# Patient Record
Sex: FEMALE | Race: WHITE | NOT HISPANIC OR LATINO | Employment: OTHER | URBAN - METROPOLITAN AREA
[De-identification: names, ages, dates, MRNs, and addresses within clinical notes are randomized per-mention and may not be internally consistent; named-entity substitution may affect disease eponyms.]

---

## 2021-11-20 ENCOUNTER — APPOINTMENT (OUTPATIENT)
Dept: RADIOLOGY | Facility: CLINIC | Age: 31
DRG: 916 | End: 2021-11-20
Attending: STUDENT IN AN ORGANIZED HEALTH CARE EDUCATION/TRAINING PROGRAM

## 2021-11-20 ENCOUNTER — HOSPITAL ENCOUNTER (INPATIENT)
Facility: CLINIC | Age: 31
LOS: 1 days | Discharge: HOME | DRG: 916 | End: 2021-11-21
Attending: STUDENT IN AN ORGANIZED HEALTH CARE EDUCATION/TRAINING PROGRAM | Admitting: FAMILY MEDICINE

## 2021-11-20 DIAGNOSIS — F41.9 ANXIETY: Primary | ICD-10-CM

## 2021-11-20 DIAGNOSIS — T78.40XA ALLERGIC REACTION, INITIAL ENCOUNTER: ICD-10-CM

## 2021-11-20 LAB
AMPHETAMINES UR QL SCN: ABNORMAL
ANION GAP SERPL CALCULATED.3IONS-SCNC: 10 MMOL/L (ref 5–18)
ATRIAL RATE - MUSE: 87 BPM
BARBITURATES UR QL: ABNORMAL
BASOPHILS # BLD AUTO: 0 10E3/UL (ref 0–0.2)
BASOPHILS NFR BLD AUTO: 0 %
BENZODIAZ UR QL: ABNORMAL
BUN SERPL-MCNC: 11 MG/DL (ref 8–22)
CALCIUM SERPL-MCNC: 9.5 MG/DL (ref 8.5–10.5)
CANNABINOIDS UR QL SCN: ABNORMAL
CHLORIDE BLD-SCNC: 113 MMOL/L (ref 98–107)
CO2 SERPL-SCNC: 18 MMOL/L (ref 22–31)
COCAINE UR QL: ABNORMAL
CREAT SERPL-MCNC: 0.9 MG/DL (ref 0.6–1.1)
CREAT UR-MCNC: 32 MG/DL
DIASTOLIC BLOOD PRESSURE - MUSE: 78 MMHG
EOSINOPHIL # BLD AUTO: 0 10E3/UL (ref 0–0.7)
EOSINOPHIL NFR BLD AUTO: 0 %
ERYTHROCYTE [DISTWIDTH] IN BLOOD BY AUTOMATED COUNT: 12.1 % (ref 10–15)
GFR SERPL CREATININE-BSD FRML MDRD: 85 ML/MIN/1.73M2
GLUCOSE BLD-MCNC: 148 MG/DL (ref 70–125)
HCT VFR BLD AUTO: 37.9 % (ref 35–47)
HGB BLD-MCNC: 12.9 G/DL (ref 11.7–15.7)
IMM GRANULOCYTES # BLD: 0.1 10E3/UL
IMM GRANULOCYTES NFR BLD: 0 %
INTERPRETATION ECG - MUSE: NORMAL
LYMPHOCYTES # BLD AUTO: 3 10E3/UL (ref 0.8–5.3)
LYMPHOCYTES NFR BLD AUTO: 17 %
MAGNESIUM SERPL-MCNC: 2 MG/DL (ref 1.8–2.6)
MCH RBC QN AUTO: 29.9 PG (ref 26.5–33)
MCHC RBC AUTO-ENTMCNC: 34 G/DL (ref 31.5–36.5)
MCV RBC AUTO: 88 FL (ref 78–100)
MONOCYTES # BLD AUTO: 0.7 10E3/UL (ref 0–1.3)
MONOCYTES NFR BLD AUTO: 4 %
NEUTROPHILS # BLD AUTO: 14 10E3/UL (ref 1.6–8.3)
NEUTROPHILS NFR BLD AUTO: 79 %
NRBC # BLD AUTO: 0 10E3/UL
NRBC BLD AUTO-RTO: 0 /100
OPIATES UR QL SCN: ABNORMAL
OXYCODONE UR QL: ABNORMAL
P AXIS - MUSE: 61 DEGREES
PCP UR QL SCN: ABNORMAL
PLATELET # BLD AUTO: 350 10E3/UL (ref 150–450)
POTASSIUM BLD-SCNC: 3.3 MMOL/L (ref 3.5–5)
PR INTERVAL - MUSE: 172 MS
QRS DURATION - MUSE: 98 MS
QT - MUSE: 372 MS
QTC - MUSE: 447 MS
R AXIS - MUSE: 53 DEGREES
RBC # BLD AUTO: 4.31 10E6/UL (ref 3.8–5.2)
SARS-COV-2 RNA RESP QL NAA+PROBE: NEGATIVE
SODIUM SERPL-SCNC: 141 MMOL/L (ref 136–145)
SYSTOLIC BLOOD PRESSURE - MUSE: 126 MMHG
T AXIS - MUSE: 36 DEGREES
TSH SERPL DL<=0.005 MIU/L-ACNC: 1.74 UIU/ML (ref 0.3–5)
VENTRICULAR RATE- MUSE: 87 BPM
WBC # BLD AUTO: 17.8 10E3/UL (ref 4–11)

## 2021-11-20 PROCEDURE — 99223 1ST HOSP IP/OBS HIGH 75: CPT | Performed by: FAMILY MEDICINE

## 2021-11-20 PROCEDURE — 250N000013 HC RX MED GY IP 250 OP 250 PS 637: Performed by: INTERNAL MEDICINE

## 2021-11-20 PROCEDURE — 80307 DRUG TEST PRSMV CHEM ANLYZR: CPT | Performed by: FAMILY MEDICINE

## 2021-11-20 PROCEDURE — 272N000452 HC KIT SHRLOCK 5FR POWER PICC TRIPLE LUMEN

## 2021-11-20 PROCEDURE — 99285 EMERGENCY DEPT VISIT HI MDM: CPT | Mod: 25

## 2021-11-20 PROCEDURE — 250N000013 HC RX MED GY IP 250 OP 250 PS 637: Performed by: STUDENT IN AN ORGANIZED HEALTH CARE EDUCATION/TRAINING PROGRAM

## 2021-11-20 PROCEDURE — 250N000011 HC RX IP 250 OP 636: Performed by: INTERNAL MEDICINE

## 2021-11-20 PROCEDURE — 87635 SARS-COV-2 COVID-19 AMP PRB: CPT | Performed by: STUDENT IN AN ORGANIZED HEALTH CARE EDUCATION/TRAINING PROGRAM

## 2021-11-20 PROCEDURE — 258N000003 HC RX IP 258 OP 636: Performed by: STUDENT IN AN ORGANIZED HEALTH CARE EDUCATION/TRAINING PROGRAM

## 2021-11-20 PROCEDURE — 250N000009 HC RX 250: Performed by: STUDENT IN AN ORGANIZED HEALTH CARE EDUCATION/TRAINING PROGRAM

## 2021-11-20 PROCEDURE — C9803 HOPD COVID-19 SPEC COLLECT: HCPCS

## 2021-11-20 PROCEDURE — 96375 TX/PRO/DX INJ NEW DRUG ADDON: CPT

## 2021-11-20 PROCEDURE — 85025 COMPLETE CBC W/AUTO DIFF WBC: CPT | Performed by: STUDENT IN AN ORGANIZED HEALTH CARE EDUCATION/TRAINING PROGRAM

## 2021-11-20 PROCEDURE — 83735 ASSAY OF MAGNESIUM: CPT | Performed by: INTERNAL MEDICINE

## 2021-11-20 PROCEDURE — 200N000001 HC R&B ICU

## 2021-11-20 PROCEDURE — 80048 BASIC METABOLIC PNL TOTAL CA: CPT | Performed by: INTERNAL MEDICINE

## 2021-11-20 PROCEDURE — 96372 THER/PROPH/DIAG INJ SC/IM: CPT | Performed by: STUDENT IN AN ORGANIZED HEALTH CARE EDUCATION/TRAINING PROGRAM

## 2021-11-20 PROCEDURE — 93005 ELECTROCARDIOGRAM TRACING: CPT | Performed by: STUDENT IN AN ORGANIZED HEALTH CARE EDUCATION/TRAINING PROGRAM

## 2021-11-20 PROCEDURE — 36569 INSJ PICC 5 YR+ W/O IMAGING: CPT

## 2021-11-20 PROCEDURE — 36415 COLL VENOUS BLD VENIPUNCTURE: CPT | Performed by: STUDENT IN AN ORGANIZED HEALTH CARE EDUCATION/TRAINING PROGRAM

## 2021-11-20 PROCEDURE — 71045 X-RAY EXAM CHEST 1 VIEW: CPT

## 2021-11-20 PROCEDURE — 250N000012 HC RX MED GY IP 250 OP 636 PS 637: Performed by: STUDENT IN AN ORGANIZED HEALTH CARE EDUCATION/TRAINING PROGRAM

## 2021-11-20 PROCEDURE — 96365 THER/PROPH/DIAG IV INF INIT: CPT

## 2021-11-20 PROCEDURE — 96366 THER/PROPH/DIAG IV INF ADDON: CPT

## 2021-11-20 PROCEDURE — 36415 COLL VENOUS BLD VENIPUNCTURE: CPT | Performed by: INTERNAL MEDICINE

## 2021-11-20 PROCEDURE — 250N000011 HC RX IP 250 OP 636: Performed by: STUDENT IN AN ORGANIZED HEALTH CARE EDUCATION/TRAINING PROGRAM

## 2021-11-20 PROCEDURE — 96361 HYDRATE IV INFUSION ADD-ON: CPT

## 2021-11-20 PROCEDURE — 84443 ASSAY THYROID STIM HORMONE: CPT | Performed by: FAMILY MEDICINE

## 2021-11-20 PROCEDURE — 3E043XZ INTRODUCTION OF VASOPRESSOR INTO CENTRAL VEIN, PERCUTANEOUS APPROACH: ICD-10-PCS | Performed by: FAMILY MEDICINE

## 2021-11-20 RX ORDER — POTASSIUM CHLORIDE 7.45 MG/ML
10 INJECTION INTRAVENOUS
Status: DISCONTINUED | OUTPATIENT
Start: 2021-11-20 | End: 2021-11-20

## 2021-11-20 RX ORDER — PREDNISONE 20 MG/1
40 TABLET ORAL ONCE
Status: COMPLETED | OUTPATIENT
Start: 2021-11-20 | End: 2021-11-20

## 2021-11-20 RX ORDER — DIPHENHYDRAMINE HYDROCHLORIDE 50 MG/ML
25 INJECTION INTRAMUSCULAR; INTRAVENOUS ONCE
Status: COMPLETED | OUTPATIENT
Start: 2021-11-20 | End: 2021-11-20

## 2021-11-20 RX ORDER — PREDNISONE 20 MG/1
40 TABLET ORAL DAILY
Status: ON HOLD | COMMUNITY
Start: 2021-11-19 | End: 2021-11-21

## 2021-11-20 RX ORDER — ONDANSETRON 2 MG/ML
4 INJECTION INTRAMUSCULAR; INTRAVENOUS EVERY 6 HOURS PRN
Status: DISCONTINUED | OUTPATIENT
Start: 2021-11-20 | End: 2021-11-21 | Stop reason: HOSPADM

## 2021-11-20 RX ORDER — LIDOCAINE 40 MG/G
CREAM TOPICAL
Status: DISCONTINUED | OUTPATIENT
Start: 2021-11-20 | End: 2021-11-20 | Stop reason: CLARIF

## 2021-11-20 RX ORDER — EPINEPHRINE 1 MG/ML
0.3 INJECTION, SOLUTION INTRAMUSCULAR; SUBCUTANEOUS ONCE
Status: COMPLETED | OUTPATIENT
Start: 2021-11-20 | End: 2021-11-20

## 2021-11-20 RX ORDER — ONDANSETRON 4 MG/1
4 TABLET, ORALLY DISINTEGRATING ORAL EVERY 6 HOURS PRN
Status: DISCONTINUED | OUTPATIENT
Start: 2021-11-20 | End: 2021-11-21 | Stop reason: HOSPADM

## 2021-11-20 RX ORDER — DIPHENHYDRAMINE HYDROCHLORIDE 50 MG/ML
25 INJECTION INTRAMUSCULAR; INTRAVENOUS EVERY 12 HOURS
Status: DISCONTINUED | OUTPATIENT
Start: 2021-11-20 | End: 2021-11-21

## 2021-11-20 RX ORDER — DESVENLAFAXINE 100 MG/1
100 TABLET, EXTENDED RELEASE ORAL DAILY
COMMUNITY
Start: 2021-08-20

## 2021-11-20 RX ORDER — ACETAMINOPHEN 325 MG/1
650 TABLET ORAL EVERY 4 HOURS PRN
Status: DISCONTINUED | OUTPATIENT
Start: 2021-11-20 | End: 2021-11-21 | Stop reason: HOSPADM

## 2021-11-20 RX ORDER — BUSPIRONE HYDROCHLORIDE 10 MG/1
20 TABLET ORAL DAILY
COMMUNITY
Start: 2021-08-23 | End: 2022-08-23

## 2021-11-20 RX ORDER — EPINEPHRINE 0.3 MG/.3ML
0.3 INJECTION SUBCUTANEOUS PRN
Status: ON HOLD | COMMUNITY
Start: 2021-11-19 | End: 2021-11-21

## 2021-11-20 RX ORDER — CETIRIZINE HYDROCHLORIDE 10 MG/1
10 TABLET ORAL ONCE
Status: COMPLETED | OUTPATIENT
Start: 2021-11-20 | End: 2021-11-20

## 2021-11-20 RX ORDER — POTASSIUM CHLORIDE 29.8 MG/ML
20 INJECTION INTRAVENOUS ONCE
Status: COMPLETED | OUTPATIENT
Start: 2021-11-20 | End: 2021-11-20

## 2021-11-20 RX ORDER — LIDOCAINE 40 MG/G
CREAM TOPICAL
Status: DISCONTINUED | OUTPATIENT
Start: 2021-11-20 | End: 2021-11-21 | Stop reason: HOSPADM

## 2021-11-20 RX ORDER — METHYLPREDNISOLONE SODIUM SUCCINATE 40 MG/ML
40 INJECTION, POWDER, LYOPHILIZED, FOR SOLUTION INTRAMUSCULAR; INTRAVENOUS EVERY 12 HOURS
Status: DISCONTINUED | OUTPATIENT
Start: 2021-11-20 | End: 2021-11-21 | Stop reason: HOSPADM

## 2021-11-20 RX ORDER — METHYLPREDNISOLONE SODIUM SUCCINATE 125 MG/2ML
125 INJECTION, POWDER, LYOPHILIZED, FOR SOLUTION INTRAMUSCULAR; INTRAVENOUS ONCE
Status: COMPLETED | OUTPATIENT
Start: 2021-11-20 | End: 2021-11-20

## 2021-11-20 RX ORDER — SODIUM CHLORIDE 9 MG/ML
INJECTION, SOLUTION INTRAVENOUS CONTINUOUS
Status: DISCONTINUED | OUTPATIENT
Start: 2021-11-20 | End: 2021-11-21 | Stop reason: HOSPADM

## 2021-11-20 RX ORDER — DIPHENHYDRAMINE HCL 25 MG
50 CAPSULE ORAL EVERY 6 HOURS PRN
Status: ON HOLD | COMMUNITY
End: 2021-11-21

## 2021-11-20 RX ADMIN — POTASSIUM & SODIUM PHOSPHATES POWDER PACK 280-160-250 MG 1 PACKET: 280-160-250 PACK at 21:07

## 2021-11-20 RX ADMIN — DIPHENHYDRAMINE HYDROCHLORIDE 25 MG: 50 INJECTION INTRAMUSCULAR; INTRAVENOUS at 13:15

## 2021-11-20 RX ADMIN — POTASSIUM CHLORIDE 20 MEQ: 29.8 INJECTION, SOLUTION INTRAVENOUS at 16:47

## 2021-11-20 RX ADMIN — EPINEPHRINE 0.3 MG: 1 INJECTION INTRAMUSCULAR; INTRAVENOUS; SUBCUTANEOUS at 14:09

## 2021-11-20 RX ADMIN — CETIRIZINE HYDROCHLORIDE 10 MG: 10 TABLET, FILM COATED ORAL at 13:06

## 2021-11-20 RX ADMIN — LIDOCAINE HYDROCHLORIDE 2 ML: 10 INJECTION, SOLUTION EPIDURAL; INFILTRATION; INTRACAUDAL; PERINEURAL at 15:40

## 2021-11-20 RX ADMIN — METHYLPREDNISOLONE SODIUM SUCCINATE 125 MG: 125 INJECTION, POWDER, FOR SOLUTION INTRAMUSCULAR; INTRAVENOUS at 14:43

## 2021-11-20 RX ADMIN — SODIUM CHLORIDE 1000 ML: 9 INJECTION, SOLUTION INTRAVENOUS at 13:13

## 2021-11-20 RX ADMIN — POTASSIUM & SODIUM PHOSPHATES POWDER PACK 280-160-250 MG 1 PACKET: 280-160-250 PACK at 17:43

## 2021-11-20 RX ADMIN — FAMOTIDINE 20 MG: 10 INJECTION, SOLUTION INTRAVENOUS at 13:16

## 2021-11-20 RX ADMIN — EPINEPHRINE 0.03 MCG/KG/MIN: 1 INJECTION INTRAMUSCULAR; INTRAVENOUS; SUBCUTANEOUS at 14:29

## 2021-11-20 RX ADMIN — PREDNISONE 40 MG: 20 TABLET ORAL at 13:06

## 2021-11-20 ASSESSMENT — ACTIVITIES OF DAILY LIVING (ADL)
DOING_ERRANDS_INDEPENDENTLY_DIFFICULTY: NO
CONCENTRATING,_REMEMBERING_OR_MAKING_DECISIONS_DIFFICULTY: NO
DEPENDENT_IADLS:: INDEPENDENT
WALKING_OR_CLIMBING_STAIRS_DIFFICULTY: NO
ADLS_ACUITY_SCORE: 4
TOILETING_ISSUES: NO
VISION_MANAGEMENT: GLASSES
ADLS_ACUITY_SCORE: 8
DIFFICULTY_COMMUNICATING: NO
DIFFICULTY_EATING/SWALLOWING: NO
WEAR_GLASSES_OR_BLIND: YES
ADLS_ACUITY_SCORE: 4
HEARING_DIFFICULTY_OR_DEAF: NO
ADLS_ACUITY_SCORE: 4
ADLS_ACUITY_SCORE: 8
FALL_HISTORY_WITHIN_LAST_SIX_MONTHS: NO
DRESSING/BATHING_DIFFICULTY: NO

## 2021-11-20 ASSESSMENT — MIFFLIN-ST. JEOR: SCORE: 1615.36

## 2021-11-20 NOTE — CONSULTS
Care Management Initial Consult    General Information  Assessment completed with: Patient,    Type of CM/SW Visit: Initial Assessment    Primary Care Provider verified and updated as needed:     Readmission within the last 30 days:        Reason for Consult: discharge planning  Advance Care Planning:            Communication Assessment  Patient's communication style: spoken language (English or Bilingual)    Hearing Difficulty or Deaf: no   Wear Glasses or Blind: yes    Cognitive  Cognitive/Neuro/Behavioral: WDL                      Living Environment:   People in home: sibling(s)     Current living Arrangements: apartment      Able to return to prior arrangements: yes       Family/Social Support:  Care provided by: self  Provides care for: no one  Marital Status: Single  Sibling(s),Parent(s)          Description of Support System: Involved,Supportive    Support Assessment: Adequate social supports    Current Resources:   Patient receiving home care services: No     Community Resources: None  Equipment currently used at home: none  Supplies currently used at home: None    Employment/Financial:  Employment Status:          Financial Concerns: No concerns identified   Referral to Financial Counselor: No       Lifestyle & Psychosocial Needs:  Social Determinants of Health     Tobacco Use: Not on file   Alcohol Use: Not on file   Financial Resource Strain: Not on file   Food Insecurity: Not on file   Transportation Needs: Not on file   Physical Activity: Not on file   Stress: Not on file   Social Connections: Not on file   Intimate Partner Violence: Not on file   Depression: Not on file   Housing Stability: Not on file       Functional Status:  Prior to admission patient needed assistance:   Dependent ADLs:: Independent  Dependent IADLs:: Independent  Assesssment of Functional Status: At functional baseline    Mental Health Status:  Mental Health Status: No Current Concerns       Chemical Dependency Status:  Chemical  Dependency Status: No Current Concerns             Values/Beliefs:  Spiritual, Cultural Beliefs, Hoahaoism Practices, Values that affect care: no               Additional Information:  JUVENAL assessed. Pt resides at home with her sister, she is independent with ADLs/IADLs at baseline. Discharge plan to return home, no anticipated CM needs.    Teena Campuzano LGSW

## 2021-11-20 NOTE — ED TRIAGE NOTES
Pt presents to triage with allergic reaction to unknown allergen. No previous known allergies or hx of anaphylaxis. Pt began to have hives for about 2 weeks and yesterday oral pharengeal swelling began. Patient was seen in urgen care and given prednison, benadryl and epi. Symptoms improved and patient was discharge. This morning symptoms have returned. Pt stated it is difficult to breath d/t throat tightness  Madhavi Ng RN on 11/20/2021 at 12:25 PM     You were seen by  Dr Gramajo     1. Stop metoprolol.     2. Increase fluids (water)    3. Lexiscan Stress test and MRI of the brain  has been ordered. You will be called to schedule this.  You will receive instructions for testing at that time.  You will be contacted with results.        You will follow up with [facility] in 3-6, sooner if needed.       Please call the cardiology office with problems, questions, or concerns at [phone].    If you experience chest pain, chest pressure, chest tightness, shortness of breath, fainting, lightheadedness, nausea, vomiting, or other concerning symptoms, please report to the Emergency Department or call 911. These symptoms may be emergent, and best treated in the Emergency Department.     Cardiology Nurses  VIOLET Askew RN Corrine, MAULIK Vazquez LPN  [facility] ([facility])  [phone]

## 2021-11-20 NOTE — PHARMACY-ADMISSION MEDICATION HISTORY
Pharmacy Note - Admission Medication History    Pertinent Provider Information: I was not able to access the pt's fill history at this time, so I worked with care everywhere and the med reconciliation list as well as the patient's report to gather list    Pt stated she started taking the prednisone prescription today, there are 4 doses left of the prescription listed below    Pt stated the doctor at urgent care yesterday told her to stop taking her desvenlafaxine, so she didn't take it today    Pt claims to have taken benadryl 25mg - 2 capsules at a time 4 times today, with the last dose being at 1300     ______________________________________________________________________    Prior To Admission (PTA) med list completed and updated in EMR.       PTA Med List   Medication Sig Last Dose     busPIRone (BUSPAR) 10 MG tablet Take 20 mg by mouth daily 11/20/2021     desvenlafaxine (PRISTIQ) 100 MG 24 hr tablet Take 100 mg by mouth daily 11/19/2021     diphenhydrAMINE (BENADRYL) 25 MG capsule Take 50 mg by mouth every 6 hours as needed for itching or allergies 11/20/2021 at at 1300     EPINEPHrine (ANY BX GENERIC EQUIV) 0.3 MG/0.3ML injection 2-pack Inject 0.3 mg into the muscle as needed 11/20/2021     levonorgestrel (MIRENA) 20 MCG/24HR IUD 1 each by Intrauterine route continuous      predniSONE (DELTASONE) 20 MG tablet Take 40 mg by mouth daily For 5 days 11/20/2021       [facility]  Method of interview communication: in-person    Summary of Changes to PTA Med List  New: all meds    Patient was asked about OTC/herbal products specifically.  PTA med list reflects this.    In the past week, patient estimated taking medication this percent of the time:  greater than 90%.    Allergies were reviewed, assessed, and updated with the patient.      Patient does not use any multi-dose medications prior to admission.    The information provided in this note is only as accurate as  the sources available at the time of the update(s).    Thank you for the opportunity to participate in the care of this patient.    Moira Smith  11/20/2021 2:24 PM

## 2021-11-20 NOTE — ED NOTES
Spoke with intensivist. States ok to restart epi drip.  Lips continue to be swollen but states tongue does not feel as much as previously

## 2021-11-20 NOTE — ED PROVIDER NOTES
EMERGENCY DEPARTMENT ENCOUNTER       ED Course & Medical Decision Making   12:29 PM I met with the patient, obtained history, performed an initial exam, and discussed options and plan for diagnostics and treatment here in the ED. PPE worn: N95, eye protection, gloves.    Final Impression  31 year old female presents for evaluation of refractory anaphylaxis.  Patient reports that she began having significant urticaria for the past 2 weeks, unknown precipitating agent.  Denies history of severe allergies or allergic reactions like anaphylaxis.  In the last few days it progressed to lip and face swelling including the puffiness under her eyes which prompted her to go to the urgent care last night, was given EpiPen and H1/H2 blockers and steroids and symptoms improved.  Was discharged home on steroids, though was on the lower end of the dosing spectrum at 20 mg/day.  States that her symptoms of lip and face swelling did rebound late last night and continued to worsen throughout the day today despite the 20 mg of steroids she took this morning and the H1/H2 blockers.  Patient states that she is uninsured, is concerned about cost.  No signs of imminent airway distress at time of initial evaluation, lungs clear, no wheezing, managing secretions without difficulty.  Does have some lip swelling and some swelling under her eyes, though is otherwise protecting her airway.  Initially trialed with additional H1, H2 blockers and an additional 40 mg of prednisone (20 mg of prednisone earlier this morning) and observed for a while in the ED.  Symptoms not at all improved, if anything there may be slight worsening she has a audible lisp now, though the lip swelling appears unchanged, may now have a tiny bit of tongue swelling as well.  At this juncture, we unfortunately do need to readminister epinephrine and start patient on an epi drip and admit for refractory anaphylaxis.  PICC line ordered, EpiPen and epi drip ordered.   Discussed with intensivist and hospitalist.    Prior to making a final disposition on this patient the results of patient's tests and other diagnostic studies were discussed with the patient. All questions were answered. Patient expressed understanding of the plan and was amenable to it.    Medications   predniSONE (DELTASONE) tablet 40 mg (40 mg Oral Given 11/20/21 1306)   diphenhydrAMINE (BENADRYL) injection 25 mg (25 mg Intravenous Given 11/20/21 1315)   famotidine (PEPCID) injection 20 mg (20 mg Intravenous Given 11/20/21 1316)   cetirizine (zyrTEC) tablet 10 mg (10 mg Oral Given 11/20/21 1306)   0.9% sodium chloride BOLUS (1,000 mLs Intravenous New Bag 11/20/21 1313)     Final Impression     1. Allergic reaction, initial encounter        Critical Care     Performed by: Isrrael Ruiz MD  Authorized by: Isrrael Ruiz MD                   Total critical care time: 30 minutes  Critical care was necessary to treat or prevent imminent or life-threatening deterioration of the following conditions:  Recurrent anaphylaxis requiring epi infusion  Critical care was time spent personally by me on the following activities: development of treatment plan with patient or surrogate, discussions with consultants, examination of patient, evaluation of patient's response to treatment, obtaining history from patient or surrogate, ordering and performing treatments and interventions, ordering and review of laboratory studies, ordering and review of radiographic studies, re-evaluation of patient's condition and monitoring for potential decompensation.  Critical care time was exclusive of separately billable procedures and treating other patients.    Chief Complaint     Chief Complaint   Patient presents with     Allergic Reaction       Pt presents to triage with allergic reaction to unknown allergen. Pt began to have hives and oral pharengeal swelling yesterday and was [facility] and given prednison, benadryl and  epi. Symptoms improved and patient was discharge. This morning symptoms have returned. Pt stated it is difficult to breath d/t throat tightness  Madhavi Ng RN on 11/20/2021 at 12:25 PM    LORI Ayala is a 31 year old female who presents for evaluation of an allergic reaction.    Per chart review, patient was seen yesterday, 11/19/21, at [facility] for evaluation of an allergic reaction. She had sudden onset of diffuse hives two weeks prior. She was given 50 mg Benadryl, Epi Pen injection, Pepcid, and Solu-Medrol. Signs and symptoms of anaphylaxis stabilized and improved with noted interventions. She was observed for a period of time. Given rapid resolution and lack of serious systemic symptoms, and lack or respiratory difficulty, no admission for anaphylaxis at the time. No signs of anaphylactic shock. She was discharged home in stable condition with Epi Pen, steroids, and Pepcid.     Patient reports two weeks of hives for which she has been taking Benadryl at home. She notes significant itching over most of her body. Yesterday, patient states her tongue began to swell causing some throat tightness. She also notes significant lip swelling. She was seen in urgent care and given an Epi Pen and Benadryl with relief. States her symptoms have returned and worsened today, except that he tongue does not feel as swollen yet. She has taken 20 mg Prednisone this morning. She denies any history of allergic reactions or any known allergies. Denies any recent new foods, detergents, soaps, pet exposures, or any other known triggers.    Patient denies any associated wheezing or shortness of breath. Denies any history of medical problems.      I, Dontrell Eubanks, am serving as a scribe to document services personally performed by Dr. Isrrael Ruiz MD, based on my observation and the provider's statements to me. I, Dr. Isrrael Ruiz MD attest that Dontrell Eubanks is acting in a scribe capacity, has  observed my performance of the services and has documented them in accordance with my direction.    Past Medical History     History reviewed. No pertinent past medical history.  Past Surgical History:   Procedure Laterality Date     HC REDUCTION OF LARGE BREAST      Description: Breast Surgery Reduction Procedure;  Recorded: 10/08/2012;     HC REMOVAL OF TONSILS,<13 Y/O      Description: Tonsillectomy;  Recorded: 10/08/2012;     History reviewed. No pertinent family history.   Social History     Tobacco Use     Smoking status: None     Smokeless tobacco: None   Substance Use Topics     Alcohol use: None     Drug use: None       Relevant past medical, surgical, family and social history as documented above, has been reviewed and discussed with patient. No changes or additions, unless otherwise noted in the HPI.    Current Medications     No current outpatient medications on file.    Allergies     No Known Allergies    Review of Systems     Constitutional: Denies fever, chills, unintentional weight loss  Eyes: Denies visual changes or discharge  HENT: Denies sore throat, ear pain or neck pain. Positive for tongue and lip swelling, throat tightness.  Respiratory: Denies cough, wheezing  Cardiovascular: Denies chest pain, palpitations  GI: Denies abdominal pain, nausea, vomiting  : Denies hematuria, dysuria, or flank pain  Musculoskeletal: Denies any new back pain or new muscle/joint pains  Skin: Denies wound. Positive for diffuse body rash with associated itching.  Neurologic: Denies current headache, new weakness, focal weakness    Remainder of systems reviewed, unless noted in HPI all others negative.    Physical Exam     BP (!) 143/97   Pulse 95   Temp 97.1  F (36.2  C) (Temporal)   Resp 20   Wt 79.4 kg (175 lb)   SpO2 98%   Constitutional: Awake, alert, in no acute distress  Head: Normocephalic, atraumatic.  ENMT: Mucous membranes moist.  Upper and lower lips both fairly swollen, tongue appears normal.   Uvula midline.  Managing secretions without difficulty.  Eyes: Slight puffiness underneath eyes bilaterally  Respiratory: Respirations even, unlabored. Lungs clear to ascultation bilaterally, no wheezing, no respiratory distress, saturations 98% on room air.   Cardiovascular: Regular rate and rhythm. +2 radial pulses, equal bilaterally.  GI: Abdomen soft, non-tender. No guarding or rebound.   Musculoskeletal: Moves all 4 extremities equally, strength symmetrical on bilateral uppers and lowers.  Integument: Extensive urticaria throughout trunk and upper extremities, patient itching uncontrollably  Neurologic: Alert & oriented x 3. Normal speech. Grossly normal motor and sensory function.   Psychiatric: Normal mood and affect.    Labs & Imaging     Results for orders placed or performed during the hospital encounter of 11/20/21   CBC with platelets and differential   Result Value Ref Range    WBC Count 17.8 (H) 4.0 - 11.0 10e3/uL    RBC Count 4.31 3.80 - 5.20 10e6/uL    Hemoglobin 12.9 11.7 - 15.7 g/dL    Hematocrit 37.9 35.0 - 47.0 %    MCV 88 78 - 100 fL    MCH 29.9 26.5 - 33.0 pg    MCHC 34.0 31.5 - 36.5 g/dL    RDW 12.1 10.0 - 15.0 %    Platelet Count 350 150 - 450 10e3/uL    % Neutrophils 79 %    % Lymphocytes 17 %    % Monocytes 4 %    % Eosinophils 0 %    % Basophils 0 %    % Immature Granulocytes 0 %    NRBCs per 100 WBC 0 <1 /100    Absolute Neutrophils 14.0 (H) 1.6 - 8.3 10e3/uL    Absolute Lymphocytes 3.0 0.8 - 5.3 10e3/uL    Absolute Monocytes 0.7 0.0 - 1.3 10e3/uL    Absolute Eosinophils 0.0 0.0 - 0.7 10e3/uL    Absolute Basophils 0.0 0.0 - 0.2 10e3/uL    Absolute Immature Granulocytes 0.1 (H) <=0.0 10e3/uL    Absolute NRBCs 0.0 10e3/uL       EKG: Sinus rhythm, rate 87.  .  QRS 98.  QTc 447.  No STEMI.     Isrrael Ruiz MD  11/20/21 4503

## 2021-11-20 NOTE — PROCEDURES
"PICC Line Insertion Procedure Note  Pt. Name: Lo Ayala  MRN:        [id number]  Procedure: Insertion of a  triple Lumen  5 fr  Bard SOLO (valved) Power PICC, Lot number [id number]    Indications: allergic reaction/epi drip    Contraindications : none    Procedure Details   Patient identified with 2 identifiers and \"Time Out\" conducted.  .     Central line insertion bundle followed: hand hygeine performed prior to procedure, site cleansed with cholraprep, hat, mask, sterile gloves,sterile gown worn, patient draped with maximum barrier head to toe drape, sterile field maintained.    The vein was assessed and found to be compressible and of adequate size. 2 ml 1% Lidocaine administered sq to the insertion site. A 5 Fr PICC was inserted into the basilic vein of the right arm with ultrasound guidance. 1 attempt(s) required to access vein.   Catheter threaded without difficulty. Good blood return noted.    Modified Seldinger Technique used for insertion.    The 8 sharps that are included in the PICC insertion kit were accounted for and disposed of in the sharps container prior to breakdown of the sterile field.    Catheter secured with Statlock, biopatch and Tegaderm dressing applied.    Findings:  Total catheter length  46 cm, with 2 cm exposed. Mid upper arm circumference is 29 cm. Catheter was flushed with 30 cc NS. Patient  tolerated procedure well.    Tip placement verified by 3CG technology . Tip placement in the SVC.    CLABSI prevention brochure left at bedside.    Patient's primary RN notified PICC is ready for use.    Comments:            Nahomi Cody, RN [facility]      "

## 2021-11-20 NOTE — ED NOTES
"Runs of bigeminy noted on monitor. Hospitalist paged and epi drip temporarily held.  Patient states ok but she can feel \"palpitations\"  "

## 2021-11-20 NOTE — ED NOTES
Continues to have runs of bigeminy. Continues to complain of palpations and slight chest pain when having frequent PVC's but chest pain not present ST on monitor

## 2021-11-20 NOTE — H&P
[facility] MEDICINE ADMISSION HISTORY AND PHYSICAL     Assessment & Plan       Lo Ayala is a 31 year old old female with history of longstanding depression and anxiety presented with 2-week history of intermittent urticaria which seem to respond to diphenhydramine.  Yesterday started having swelling of her tongue and lips and went to the Urgency Room.  She was given IV Solu-Medrol, diphenhydramine, famotidine and IM epinephrine with significant improvement.  Was discharged on prednisone 20 mg a day x5 days along with continuing her as needed diphenhydramine.  She had recurrence of rash with pruritus earlier today along with tongue swelling and lip swelling and presented to our emergency room.  Here she has been treated with oral prednisone 40 mg, IV Solu-Medrol 125 mg, IV epinephrine, IV famotidine and diphenhydramine.  After approximately 1 hour there was no improvement in symptoms and emergency room physician elected to start her on IV epinephrine drip and PICC line has been ordered and placed.  Patient did receive IV normal saline bolus of 1000 mL.  With bigeminy the epinephrine drip was temporarily held and magnesium was normal potassium 3.3.  This is in the process of being replaced.  Intensivist has been consulted and patient will be in the ICU.  Intensivist has okayed restarting the epinephrine after IV potassium.  Patient reports that her tongue swelling seems to have resolved and her lip swelling is moderately improved and the pruritus she had earlier today has also resolved.  She denies any respiratory symptoms.    Refractory anaphylaxis.  Etiology unclear.  No new medications.  Denies any supplements or over-the-counter medications other than occasionally some acetaminophen.  She did take 1 dose of Excedrin yesterday at onset of worsening symptoms but had not taken that prior.  Reports symptoms have been worse at night.  She switch to Free and clear 1 week ago from [employer].  Uses [employer] fabric softener sheets.  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  Uses lots of products but nothing new.  She was prescribed topical diclofenac about 4 months ago but reports she never took it.  Denies any NSAID use.  Since she now seems moderately improved we will let her eat.    Bigeminy which seems to be coincidental and started about 12 minutes after epinephrine drip.  Plan: Replace potassium and follow on telemetry.  Intensivist is aware.  I discussed with pharmacist and no current medications including acute medicines should be related to this.    Hypokalemia with potassium 3.3.  Suspect in part due to 1 L of normal saline.  Plan: Potassium replacement per intensivist    Chronic depression and anxiety.  Has been on buspirone since July 2021 and desvenlafaxine since February 2021.  She did not take either of these medications today.  Plan: Put on hold both the buspirone and the desvenlafaxine for now.  She is fine with this.    Past history of hypothyroidism on old problem list.  Not on any current medications.  Plan: Check TSH    Past history of tobacco use disorder.  Quit in 2019.  Had been smoking 1/4 pack/day.    Marijuana use.  Last used 2 days ago and only occasionally uses not even once a week.    Alcohol use is rare and none x2 weeks.    Covid status: Covid infection in June 2021.  Fully immunized now with more during vaccine second shot completed in July 2021.    Trip to [place] in July 2021.      CODE STATUS: Full code    DVT prophylaxis: Ambulation as she is at low risk    Disposition: Inpatient and in the ICU.    Goals for the hospitalization: Try to play  and determine the etiology of her recurrent anaphylaxis and urticaria.  Resolution of anaphylaxis symptoms.    Chief Complaint  recurrent anaphylaxis     HISTORY     Lo Ayala is a 31 year old old female with past history of persistent anxiety and depression.  Overall has been doing well until 2 weeks prior to  this time and she developed recurrent urticaria that seem to improve with diphenhydramine.  No recent change in any medications or topical products.  Yesterday had new symptom of swelling of her tongue and lips.  Was seen in the Urgency Room and responded to appropriate medicines but had recurrent symptoms today and presented to our emergency room.    Past Medical History     Anxiety  Depression      Surgical History     Past Surgical History:   Procedure Laterality Date     HC REDUCTION OF LARGE BREAST      Description: Breast Surgery Reduction Procedure;  Recorded: 10/08/2012;     HC REMOVAL OF TONSILS,<13 Y/O      Description: Tonsillectomy;  Recorded: 10/08/2012;   Elbow surgery in 2021  Family History    Reviewed, and History reviewed. No pertinent family history.  Denies any family history of anaphylaxis or allergic reactions  Social History      She lives in an apartment with her sister  Runs her own dog [employer] business  Quit smoking in 2019 after 1/4 pack/day  Alcohol use is rare and none x2 weeks  Occasional marijuana use but less than weekly    Allergies   No Known Allergies  Prior to Admission Medications      Prior to Admission Medications   Prescriptions Last Dose Informant Patient Reported? Taking?   EPINEPHrine (ANY BX GENERIC EQUIV) 0.3 MG/0.3ML injection 2-pack 2021  Yes Yes   Sig: Inject 0.3 mg into the muscle as needed   busPIRone (BUSPAR) 10 MG tablet 2021  Yes Yes   Sig: Take 20 mg by mouth daily   desvenlafaxine (PRISTIQ) 100 MG 24 hr tablet 2021  Yes Yes   Sig: Take 100 mg by mouth daily   diphenhydrAMINE (BENADRYL) 25 MG capsule 2021 at at 1300  Yes Yes   Sig: Take 50 mg by mouth every 6 hours as needed for itching or allergies   levonorgestrel (MIRENA) 20 MCG/24HR IUD   Yes Yes   Si each by Intrauterine route continuous   predniSONE (DELTASONE) 20 MG tablet 2021  Yes Yes   Sig: Take 40 mg by mouth daily For 5 days      Facility-Administered  Medications: None      Review of Systems     A 12 point comprehensive review of systems was negative except as noted above in HPI.    PHYSICAL EXAMINATION       Vitals      Temp:  [97.1  F (36.2  C)] 97.1  F (36.2  C)  Pulse:  [84-99] 88  Resp:  [20-26] 26  BP: (114-143)/(64-97) 120/72  SpO2:  [94 %-99 %] 97 %    Examination     GENERAL:  Alert, appears comfortable, in no acute distress, appears stated age   HEAD:  Normocephalic, without obvious abnormality, atraumatic   EYES:  PERRL, EOM's intact   NOSE: No drainage   THROAT: Lips, mucosa, and tongue normal; teeth and gums normal, mouth moist.  She states her tongue was definitely swollen earlier but now that has resolved.  Lips appear to be mildly swollen and she states that is definitely improved from when she presented to the emergency room   NECK: Supple, symmetrical, trachea midline   BACK:   Symmetric, no curvature, ROM normal   LUNGS:   Clear to auscultation bilaterally, no rales, rhonchi, or wheezing, symmetric chest rise on inhalation, respirations unlabored   HEART:  Regular rate and rhythm, S1 and S2 normal, no murmur, rub, or gallop    ABDOMEN:   Soft, non-tender, bowel sounds active all four quadrants, no masses, no organomegaly, no rebound or guarding   EXTREMITIES: Extremities normal, atraumatic, no cyanosis or edema    SKIN: Dry to touch, no exanthems in the visualized areas   NEURO: Alert, appears cognitively intact, moves all four extremities freely, non-focal   PSYCH: Cooperative, behavior is appropriate      Pertinent Lab     Most Recent 3 CBC's:Recent Labs   Lab Test 11/20/21  1312   WBC 17.8*   HGB 12.9   MCV 88        Most Recent 3 BMP's:Recent Labs   Lab Test 11/20/21  1505      POTASSIUM 3.3*   CHLORIDE 113*   CO2 18*   BUN 11   CR 0.90   ANIONGAP 10   MAVERICK 9.5   *     Most Recent 2 LFT's:No lab results found.      Pertinent Radiology     EKG Results: Sinus rhythm with some nonspecific ST changes.  Otherwise  unremarkable    Advance Care Planning        Bill Zuñiga MD  [facility]   Phone: #[phone]

## 2021-11-21 VITALS
RESPIRATION RATE: 20 BRPM | OXYGEN SATURATION: 96 % | TEMPERATURE: 96.2 F | BODY MASS INDEX: 28.46 KG/M2 | WEIGHT: 187.8 LBS | DIASTOLIC BLOOD PRESSURE: 85 MMHG | HEART RATE: 62 BPM | HEIGHT: 68 IN | SYSTOLIC BLOOD PRESSURE: 140 MMHG

## 2021-11-21 LAB
ANION GAP SERPL CALCULATED.3IONS-SCNC: 10 MMOL/L (ref 5–18)
BASOPHILS # BLD AUTO: 0 10E3/UL (ref 0–0.2)
BASOPHILS NFR BLD AUTO: 0 %
BUN SERPL-MCNC: 11 MG/DL (ref 8–22)
C4 SERPL-MCNC: 26 MG/DL (ref 19–59)
CALCIUM SERPL-MCNC: 9.1 MG/DL (ref 8.5–10.5)
CHLORIDE BLD-SCNC: 109 MMOL/L (ref 98–107)
CO2 SERPL-SCNC: 20 MMOL/L (ref 22–31)
CREAT SERPL-MCNC: 0.96 MG/DL (ref 0.6–1.1)
EOSINOPHIL # BLD AUTO: 0 10E3/UL (ref 0–0.7)
EOSINOPHIL NFR BLD AUTO: 0 %
ERYTHROCYTE [DISTWIDTH] IN BLOOD BY AUTOMATED COUNT: 12.6 % (ref 10–15)
GFR SERPL CREATININE-BSD FRML MDRD: 79 ML/MIN/1.73M2
GLUCOSE BLD-MCNC: 258 MG/DL (ref 70–125)
GLUCOSE BLDC GLUCOMTR-MCNC: 146 MG/DL (ref 70–99)
GLUCOSE BLDC GLUCOMTR-MCNC: 242 MG/DL (ref 70–99)
HCG SERPL QL: NEGATIVE
HCT VFR BLD AUTO: 35.4 % (ref 35–47)
HGB BLD-MCNC: 11.7 G/DL (ref 11.7–15.7)
IMM GRANULOCYTES # BLD: 0.1 10E3/UL
IMM GRANULOCYTES NFR BLD: 1 %
LYMPHOCYTES # BLD AUTO: 1.7 10E3/UL (ref 0.8–5.3)
LYMPHOCYTES NFR BLD AUTO: 11 %
MCH RBC QN AUTO: 29.7 PG (ref 26.5–33)
MCHC RBC AUTO-ENTMCNC: 33.1 G/DL (ref 31.5–36.5)
MCV RBC AUTO: 90 FL (ref 78–100)
MONOCYTES # BLD AUTO: 0.3 10E3/UL (ref 0–1.3)
MONOCYTES NFR BLD AUTO: 2 %
NEUTROPHILS # BLD AUTO: 13.7 10E3/UL (ref 1.6–8.3)
NEUTROPHILS NFR BLD AUTO: 86 %
NRBC # BLD AUTO: 0 10E3/UL
NRBC BLD AUTO-RTO: 0 /100
PLATELET # BLD AUTO: 330 10E3/UL (ref 150–450)
POTASSIUM BLD-SCNC: 4.4 MMOL/L (ref 3.5–5)
RBC # BLD AUTO: 3.94 10E6/UL (ref 3.8–5.2)
SODIUM SERPL-SCNC: 139 MMOL/L (ref 136–145)
WBC # BLD AUTO: 15.7 10E3/UL (ref 4–11)

## 2021-11-21 PROCEDURE — 250N000013 HC RX MED GY IP 250 OP 250 PS 637: Performed by: FAMILY MEDICINE

## 2021-11-21 PROCEDURE — G0008 ADMIN INFLUENZA VIRUS VAC: HCPCS | Performed by: FAMILY MEDICINE

## 2021-11-21 PROCEDURE — 86160 COMPLEMENT ANTIGEN: CPT | Performed by: INTERNAL MEDICINE

## 2021-11-21 PROCEDURE — 99233 SBSQ HOSP IP/OBS HIGH 50: CPT | Performed by: INTERNAL MEDICINE

## 2021-11-21 PROCEDURE — 250N000011 HC RX IP 250 OP 636: Performed by: FAMILY MEDICINE

## 2021-11-21 PROCEDURE — 250N000012 HC RX MED GY IP 250 OP 636 PS 637: Performed by: INTERNAL MEDICINE

## 2021-11-21 PROCEDURE — 82785 ASSAY OF IGE: CPT | Performed by: INTERNAL MEDICINE

## 2021-11-21 PROCEDURE — 90686 IIV4 VACC NO PRSV 0.5 ML IM: CPT | Performed by: FAMILY MEDICINE

## 2021-11-21 PROCEDURE — 83520 IMMUNOASSAY QUANT NOS NONAB: CPT | Performed by: INTERNAL MEDICINE

## 2021-11-21 PROCEDURE — 250N000011 HC RX IP 250 OP 636: Performed by: INTERNAL MEDICINE

## 2021-11-21 PROCEDURE — 250N000009 HC RX 250: Performed by: FAMILY MEDICINE

## 2021-11-21 PROCEDURE — 36592 COLLECT BLOOD FROM PICC: CPT | Performed by: INTERNAL MEDICINE

## 2021-11-21 PROCEDURE — 99238 HOSP IP/OBS DSCHRG MGMT 30/<: CPT | Performed by: FAMILY MEDICINE

## 2021-11-21 PROCEDURE — 85025 COMPLETE CBC W/AUTO DIFF WBC: CPT | Performed by: FAMILY MEDICINE

## 2021-11-21 PROCEDURE — 80048 BASIC METABOLIC PNL TOTAL CA: CPT | Performed by: FAMILY MEDICINE

## 2021-11-21 PROCEDURE — 86161 COMPLEMENT/FUNCTION ACTIVITY: CPT | Performed by: INTERNAL MEDICINE

## 2021-11-21 PROCEDURE — 258N000003 HC RX IP 258 OP 636: Performed by: INTERNAL MEDICINE

## 2021-11-21 PROCEDURE — 250N000013 HC RX MED GY IP 250 OP 250 PS 637: Performed by: INTERNAL MEDICINE

## 2021-11-21 PROCEDURE — 84703 CHORIONIC GONADOTROPIN ASSAY: CPT | Performed by: INTERNAL MEDICINE

## 2021-11-21 RX ORDER — DIPHENHYDRAMINE HCL 25 MG
50 CAPSULE ORAL EVERY 4 HOURS PRN
Start: 2021-11-21

## 2021-11-21 RX ORDER — LORAZEPAM 0.5 MG/1
0.5 TABLET ORAL EVERY 4 HOURS PRN
Qty: 4 TABLET | Refills: 0 | Status: SHIPPED | OUTPATIENT
Start: 2021-11-21

## 2021-11-21 RX ORDER — FAMOTIDINE 10 MG
10 TABLET ORAL 2 TIMES DAILY
Start: 2021-11-21

## 2021-11-21 RX ORDER — EPINEPHRINE 0.3 MG/.3ML
0.3 INJECTION SUBCUTANEOUS DAILY PRN
Qty: 2 EACH | Refills: 1 | Status: SHIPPED | OUTPATIENT
Start: 2021-11-21

## 2021-11-21 RX ORDER — FAMOTIDINE 10 MG
10 TABLET ORAL 2 TIMES DAILY
Status: DISCONTINUED | OUTPATIENT
Start: 2021-11-21 | End: 2021-11-21 | Stop reason: HOSPADM

## 2021-11-21 RX ORDER — LORAZEPAM 0.5 MG/1
0.5 TABLET ORAL EVERY 4 HOURS PRN
Status: DISCONTINUED | OUTPATIENT
Start: 2021-11-21 | End: 2021-11-21 | Stop reason: HOSPADM

## 2021-11-21 RX ADMIN — INSULIN ASPART 3 UNITS: 100 INJECTION, SOLUTION INTRAVENOUS; SUBCUTANEOUS at 00:14

## 2021-11-21 RX ADMIN — INFLUENZA A VIRUS A/VICTORIA/2570/2019 IVR-215 (H1N1) ANTIGEN (FORMALDEHYDE INACTIVATED), INFLUENZA A VIRUS A/TASMANIA/503/2020 IVR-221 (H3N2) ANTIGEN (FORMALDEHYDE INACTIVATED), INFLUENZA B VIRUS B/PHUKET/3073/2013 ANTIGEN (FORMALDEHYDE INACTIVATED), AND INFLUENZA B VIRUS B/WASHINGTON/02/2019 ANTIGEN (FORMALDEHYDE INACTIVATED) 0.5 ML: 15; 15; 15; 15 INJECTION, SUSPENSION INTRAMUSCULAR at 11:06

## 2021-11-21 RX ADMIN — FAMOTIDINE 10 MG: 10 TABLET ORAL at 10:58

## 2021-11-21 RX ADMIN — INSULIN ASPART 1 UNITS: 100 INJECTION, SOLUTION INTRAVENOUS; SUBCUTANEOUS at 04:03

## 2021-11-21 RX ADMIN — FAMOTIDINE 20 MG: 10 INJECTION, SOLUTION INTRAVENOUS at 00:14

## 2021-11-21 RX ADMIN — METHYLPREDNISOLONE SODIUM SUCCINATE 40 MG: 40 INJECTION, POWDER, FOR SOLUTION INTRAMUSCULAR; INTRAVENOUS at 00:15

## 2021-11-21 RX ADMIN — DIPHENHYDRAMINE HYDROCHLORIDE 25 MG: 50 INJECTION INTRAMUSCULAR; INTRAVENOUS at 00:14

## 2021-11-21 RX ADMIN — ONDANSETRON 4 MG: 2 INJECTION INTRAMUSCULAR; INTRAVENOUS at 00:41

## 2021-11-21 RX ADMIN — POTASSIUM & SODIUM PHOSPHATES POWDER PACK 280-160-250 MG 1 PACKET: 280-160-250 PACK at 08:39

## 2021-11-21 RX ADMIN — INSULIN ASPART 1 UNITS: 100 INJECTION, SOLUTION INTRAVENOUS; SUBCUTANEOUS at 06:48

## 2021-11-21 RX ADMIN — LORAZEPAM 0.5 MG: 0.5 TABLET ORAL at 08:39

## 2021-11-21 RX ADMIN — SODIUM CHLORIDE: 9 INJECTION, SOLUTION INTRAVENOUS at 00:16

## 2021-11-21 ASSESSMENT — ACTIVITIES OF DAILY LIVING (ADL)
ADLS_ACUITY_SCORE: 6
ADLS_ACUITY_SCORE: 4
ADLS_ACUITY_SCORE: 6
ADLS_ACUITY_SCORE: 4
ADLS_ACUITY_SCORE: 6
ADLS_ACUITY_SCORE: 6

## 2021-11-21 NOTE — PROGRESS NOTES
[facility] MEDICINE PROGRESS NOTE      Identification/Summary: Lo Ayala is a pleasant 31 year old female with a past medical history of anxiety and depression who was admitted on 11/20/2021 for refractory anaphylaxis. Hospital course is notable for bigeminy post epi drip.    Assessment and Plan:    Urticaria with angioedema:  Etiology remains unclear. No new medications. Denies any supplements or over-the-counter medications other than occasionally some acetaminophen.  She did take 1 dose of Excedrin yesterday at onset of worsening symptoms but had not taken that prior. Reports symptoms have been worse at night. She switch to Free and clear 1 week ago from [employer]. Uses [employer] fabric softener sheets. 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 She was prescribed topical diclofenac about 4 months ago but reports she never took it. Denies any NSAID use. Since she now seems moderately improved we will let her eat.     Bigeminy which seems to be coincidental and started about 12 minutes after epinephrine drip.  Plan: Replace potassium and follow on telemetry.  Intensivist is aware. Discussed with pharmacist and no current medications including acute medicines should be related to this.  Since resolved.     Hypokalemia with potassium 3.3.  Suspect in part due to 1 L of normal saline.  Plan: Potassium replacement per intensivist.  hypokalemia has since resolved.     Chronic depression and anxiety.  Has been on buspirone since July 2021 and desvenlafaxine since February 2021.  She did not take either of these medications today.  Buspirone and desvenlafaxine are on hold. Pt reports increased anxiety and appears tearful.     Past history of hypothyroidism on old problem list.  Not on any current medications.   Plan: Check TSH     Past history of tobacco use disorder.  Quit in 2019.  Had been smoking 1/4 pack/day.     Marijuana use.  Last used 2 days ago  and only occasionally uses not even once a week.     Alcohol use is rare and none x2 weeks.     Covid status: Covid infection in June 2021.  Fully immunized now, Moderna vaccine second shot completed in July 2021.     Trip to [place] in July 2021.       CODE STATUS: Full code     DVT prophylaxis: Ambulation as she is at low risk     Disposition: Inpatient and in the ICU.     Goals for the hospitalization:  Attempt to determine etiology of her recurrent anaphylaxis and urticaria.  Resolution of anaphylaxis symptoms.       Interval History/Subjective:  Lo Ayala is a pleasant 31 year old female that appears tearful this morning and reports her anxiety has increased significantly, she is concerned as she is off her pristiq and buspirone. However, she reports much improvement of urticaria symptoms. She denies rash, itching, palpitations, swelling, and pain.    Physical Exam/Objective:  Temp:  [97.1  F (36.2  C)-98.5  F (36.9  C)] 97.7  F (36.5  C)  Pulse:  [] 52  Resp:  [18-42] 20  BP: ()/(53-97) 122/85  Cuff Mean (mmHg):  [70] 70  SpO2:  [93 %-99 %] 95 %  Body mass index is 28.55 kg/m .    GENERAL:  Tearful, alert, appears comfortable, in no acute distress, appears stated age   HEAD:  Normocephalic, without obvious abnormality, atraumatic   EYES:  PERRLA, conjunctiva/corneas clear, no scleral icterus, EOM's intact   NOSE: Nares normal, septum midline, mucosa normal, no drainage   THROAT: Lips, mucosa, and tongue normal; teeth and gums normal, mouth moist, patient reports improvement of swollen tongue, lips appear improved however mildly swollen.   NECK: Supple, symmetrical, trachea midline   BACK:   Symmetric, no curvature, ROM normal   LUNGS:   Clear to auscultation bilaterally, no rales, rhonchi, or wheezing, symmetric chest rise on inhalation, respirations unlabored   HEART:  Regular rate and rhythm, S1 and S2 normal, no murmur, rub, or gallop    ABDOMEN:   Soft, non-tender, bowel sounds  active all four quadrants, no masses, no organomegaly, no rebound or guarding   EXTREMITIES: Extremities normal, atraumatic, no cyanosis or edema    SKIN: Dry to touch, no exanthems in the visualized areas   NEURO: A&Ox3, moves all four extremities freely   PSYCH: Anxious, cooperative, behavior is appropriate      Data reviewed today: I personally reviewed all new medications, labs, imaging/diagnostics reports over the past 24 hours. Pertinent findings include:    Imaging:   Recent Results (from the past 24 hour(s))   XR Chest Port 1 View    Narrative    EXAM: XR CHEST PORT 1 VIEW  LOCATION: [facility]  DATE/TIME: 11/20/2021 5:33 PM    INDICATION: post-PICC placement  COMPARISON: None.      Impression    IMPRESSION: Right PICC with tip at the cavoatrial junction.       Labs:  Glucose: 146  hCG serum qualitative negative  BMP: Sodium 139, potassium 4.4, chloride 109, carbon dioxide 20, anion gap 10, urea nitrogen 11, creatinine 0.96, calcium 9.1, glucose 258, GFR estimate 79.  CBC: WBC count 15.7, RBC count 3.94, hemoglobin 11.7 platelet count 330, absolute neutrophils 13.7, absolute immature granulocytes 0.1.  Pending IGE, C1 esterase inhibitor functional and total, complement C4, and tryptase.    Medications:   Personally Reviewed.  Medications     diphenhydrAMINE  25 mg Intravenous Q12H     famotidine  20 mg Intravenous Q12H     influenza quadrivalent (PF) vacc  0.5 mL Intramuscular Prior to discharge     insulin aspart  1-6 Units Subcutaneous Q4H     methylPREDNISolone  40 mg Intravenous Q12H     potassium & sodium phosphates  1 packet Oral 4x Daily     sodium chloride (PF)  3 mL Intracatheter Q8H     Laxmi Wilkerson PA-S2  Hospitalist  [facility] Medicine  [facility]  Phone: #[phone]    Patient seen and examined  Patient discussed with physician assistant student  See my discharge summary from later in the day for details    MD Kelsey Main  [facility] Medicine Service

## 2021-11-21 NOTE — PLAN OF CARE
Problem: Adult Inpatient Plan of Care  Goal: Optimal Comfort and Wellbeing  Outcome: Adequate for Discharge     Problem: Adult Inpatient Plan of Care  Goal: Absence of Hospital-Acquired Illness or Injury  Intervention: Identify and Manage Fall Risk  Recent Flowsheet Documentation  Taken 11/21/2021 0900 by Sofia Kay, RN  Safety Promotion/Fall Prevention:   lighting adjusted   mobility aid in reach   Patient discharged home with AVS and script for meds. Will follow up with PCP on December first and follow up with allergist. Will take medications as prescribed. Vitals stable on RA. Ambulates independently at discharge.

## 2021-11-21 NOTE — PROGRESS NOTES
"[facility] Pulmonary/Critical Care Consult Team Note    Lo Ayala,  1990, MRN [id number]  Admitting Dx: Allergic reaction, initial encounter [T78.40XA]  Date / Time of Admission:  2021 12:26 PM    Overnight Events:  Intake/Output last 3 shifts:  I/O last 3 completed shifts:  In: 2655 [P.O.:1080; I.V.:525; IV Piggyback:1050]  Out: 1200 [Urine:1200]   Able to come off epi gtt around midnight  She says her symptoms are resolved/     Assessment/Plan: Lo Ayala is a 31 year old female with PMHx of Depression with anaphylaxis causing threatened airway with lip and tongue swelling.     Anaphylaxis requiring epi infusion yesterday  - off epi since 11:30 last night  - okay to stop steroids and H2 blockers  - okay to restart home meds  - she will need to follow up with an allergist   - still do not know what she is allergic too    Infusions:    sodium chloride 75 mL/hr at 21 0016     Risk Factors Present on Admission:  Clinically Significant Risk Factors Present on Admission                     Jackelyn Pereyra DO  Pulmonary and Critical Care Attending  pgr [phone]    No Known Allergies    Meds: See MAR    Physical Exam:  /83   Pulse 57   Temp 97.7  F (36.5  C) (Oral)   Resp 20   Ht 1.727 m (5' 8\")   Wt 85.2 kg (187 lb 12.8 oz)   SpO2 94%   BMI 28.55 kg/m    Intake/Output this shift:  No intake/output data recorded.  GEN: laying in bed, NAD  HEENT: no visible swelling  CVS: regular rhythm  ABD: Soft, No abdominal pain with palpation, no guarding, no rigidity  EXT: Warm, well perfused, no edema  NEURO: moving all extremities  PSYCH: pleasant     Pertinent Labs: Latest lab results in EHR personally reviewed.   CMP  Recent Labs   Lab 21  0400 21  0028 21  0001 21  1505   NA  --  139  --  141   POTASSIUM  --  4.4  --  3.3*   CHLORIDE  --  109*  --  113*   CO2  --  20*  --  18*   ANIONGAP  --  10  --  10   * 258* 242* 148*   BUN  --  11  --  11   CR "  --  0.96  --  0.90   GFRESTIMATED  --  79  --  85   MAVERICK  --  9.1  --  9.5   MAG  --   --   --  2.0     CBC  Recent Labs   Lab 11/21/21  0028 11/20/21  1312   WBC 15.7* 17.8*   RBC 3.94 4.31   HGB 11.7 12.9   HCT 35.4 37.9   MCV 90 88   MCH 29.7 29.9   MCHC 33.1 34.0   RDW 12.6 12.1    350     INRNo lab results found in last 7 days.  Arterial Blood GasNo lab results found in last 7 days.    Results for orders placed or performed during the hospital encounter of 11/20/21   XR Chest Port 1 View    Narrative    EXAM: XR CHEST PORT 1 VIEW  LOCATION: [facility]  DATE/TIME: 11/20/2021 5:33 PM    INDICATION: post-PICC placement  COMPARISON: None.      Impression    IMPRESSION: Right PICC with tip at the cavoatrial junction.       Patient Active Problem List   Diagnosis     Hypothyroidism     Delayed Puberty     Bacterial Vaginosis     Vaginal Discharge     Fatigue     Soft Tissue Pain In The Fingers     Allergic reaction, initial encounter       Jackelyn Pereyra DO  Pulmonary and Critical Care Attending  pgr [phone]

## 2021-11-21 NOTE — PROGRESS NOTES
ICU NOTE    Unclear time of ICU consult. Patient presented in ED at noon. It was documented that hospitalist and intensivisit were contacted at 2 PM.      RN contacted me about epinephrine drip at 10:40 PM.      Chart was reviewed.    32 yo F who presents to ED on 11/20 complaining of progressive swelling of face, lips and tongue over the last few days, urticaria for over one week.   Patient was evaluated in urgent care on 11/19 and received EPI pen, H1/H2 blocker and steroids. Discharged home on steroids.   Per ED note, swelling of tongue and lips, diffuse pruritic rash. No dysphagia, clear lung fields.   Patient was hemodynamically stable.   Patient was started on Epinephrine drip, steroids and H1 blocker and admitted to ICU.       Assessment    1. URTICARIA WITH ANGIOEDEMA    Plan    1. Discontinue epinephrine drip  2. Systemic steroids IV  3. H1 blocker / H2 blocker  4. IV fluids NS  5. Labs tryptase, C1 inhibitor antigen, C4 level, IgE  6. NPO, until improvement of angioedema  7. Glucose level monitoring   8. DVT prophylaxis SCDs  9. Follow up in the allergy clinic as outpatient     Full ICU consult in      Carl Delgadillo MD  Pulmonary Critical Care Medicine  11/20/21  10:39 PM

## 2021-11-22 ENCOUNTER — PATIENT OUTREACH (OUTPATIENT)
Dept: CARE COORDINATION | Facility: CLINIC | Age: 31
End: 2021-11-22

## 2021-11-22 DIAGNOSIS — Z71.89 OTHER SPECIFIED COUNSELING: ICD-10-CM

## 2021-11-22 LAB — GLUCOSE BLDC GLUCOMTR-MCNC: 145 MG/DL (ref 70–99)

## 2021-11-22 NOTE — DISCHARGE SUMMARY
[facility] MEDICINE  DISCHARGE SUMMARY     Primary Care Physician: System, Provider Not In  Admission Date: 11/20/2021   Discharge Provider: Bill Zuñiga MD Discharge Date: 11/22/2021   Diet:   Active Diet and Nourishment Order   Procedures     Diet       Code Status: Prior   Activity: DCACTIVITY: Activity as tolerated        Condition at Discharge: Good     REASON FOR PRESENTATION(See Admission Note for Details)     Recurrent angioedema x16 hours and recurrent urticaria x2 weeks    PRINCIPAL & ACTIVE DISCHARGE DIAGNOSES      Urticaria recurrent x2 weeks with recurrent angioedema over 24-hour.  All symptoms have resolved.  Plan: Discharged home today.  Discussed with intensivist.  Patient will continue famotidine 20 mg twice daily.  Diphenhydramine 50 mg every 4 hours as needed recurrence of symptoms.  Sent with a prescription for EpiPen and instructed that she definitely needed to fill that even though she does not have insurance currently. See primary care doctor within the week and they should set her up to see allergist in the near future and review lab tests ordered by the intensivist yesterday: tryptase, C1 inhibitor antigen, C4 level, IgE.  If she has recurrence of the angioedema she should return to the emergency room.  Also instructed her to play  try to keep a diary of everything she eats and products she comes in contact with and will relaunder clothing, towels and bed linen for the next week without Tide detergent or thrown the dryer fabric softener.     Bigeminy which seems to be coincidental and started about 12 minutes after epinephrine drip.   This resolved once the epinephrine drip was discontinued last night.   Patient's potassium was mildly low and that has been replaced and is now 4.4.   Plan: Discussed with primary care physician but would be reasonable for her to see cardiologist in the future.       Hypokalemia with potassium 3.3 at  admission and now normalized at 4.4 after replacement..  Suspect in part due to 1 L of normal saline given at time of admission.      Chronic depression and anxiety.  Has been on buspirone since July 2021 and desvenlafaxine since February 2021.  She did not take either of these medications the day of admission.   Pharmacist did extensive literature review and could not find any angioedema related to either of these medicines.   Patient had flare of anxiety today and possibly some mild benefit from oral lorazepam.   Plan: Resume buspirone and desvenlafaxine.  Also given a few lorazepam for anxiety flare related to hospitalization and being off her meds for couple days.  Instructed not to utilize that with alcohol. are on hold.      Past history of hypothyroidism on old problem list.  Not on any current medications.   TSH here was normal.     Past history of tobacco use disorder.  Quit in 2019.  Had been smoking 1/4 pack/day.     Marijuana use.  Last used 2 days ago and only occasionally uses not even once a week.  Plan: Encouraged her to quit this is a can be associated with mood disorders.     Alcohol use is rare and none x2 weeks.     Covid status: Covid infection in June 2021.  Fully immunized now, Moderna vaccine second shot completed in July 2021.     Trip to [place] in July 2021.       CODE STATUS: Full code     Disposition: Inpatient and in the ICU and now discharged home today.     Goals for the hospitalization:  Attempt to determine etiology of her recurrent anaphylaxis and urticaria.  Resolution of anaphylaxis symptoms.        PENDING LABS     Unresulted Labs Ordered in the Past 30 Days of this Admission     Date and Time Order Name Status Description    11/20/2021 11:00 PM IgE In process     11/20/2021 11:00 PM C1 Esterase inhibitor total In process     11/20/2021 11:00 PM C1 esterase inhibitor functional In process     11/20/2021 11:00 PM Tryptase In process             PROCEDURES ( this  hospitalization only)      None    RECOMMENDATIONS TO OUTPATIENT PROVIDER FOR F/U VISIT     Follow-up Appointments     Follow-up and recommended labs and tests       Follow up with primary care provider, Provider Not In System, within 7   days to evaluate medication change and for hospital follow- up.  No follow   up labs or test are needed.  Review hospital stay and lab test ordered to   evaluate for allergies/angioedema.  Needs to get set up to see allergist   in the near future.               DISPOSITION     Home    SUMMARY OF HOSPITAL COURSE:    Identification/Summary: [place] is a pleasant 31 year old female with a past medical history of anxiety and depression who was admitted on 11/20/2021 for recurrent angioedema.  She had been seen the evening prior to admission at the urgency room for lip and tongue swelling that seemed to respond to epinephrine, diphenhydramine and famotidine with some IV Solu-Medrol.  She had also been having intermittent urticaria x2 weeks that responded to diphenhydramine.  Symptoms returned approximately 12 hours later and she presented to our emergency room.  After repeat treatment with IM epinephrine symptoms did not seem to improve to any significant degree and then she was started on epinephrine drip.  That was paused after she was noted to be in bigeminy after about 12 minutes.  Intensivist was consulted and recommended restarting it once her potassium was repleted which had been 3.3.  By this time her tongue swelling and pretty much resolved and her lip swelling was improved.  She was admitted to the intensive care unit.  After a few more hours her lip swelling seemed to have resolved.  The epinephrine drip was discontinued.  She had some intermittent ectomy primarily bigeminy and this resolved once the epinephrine drip was off.  Morning of discharge she was feeling back to her normal self without any urticaria or pruritus or swelling.  She did feel some increased  anxiety being off her antianxiety medicines for a couple of days.  She has [email or web address] insurance starting January 1 of 2021 so is a bit anxious about cost for current medical care as she is uninsured.  She did not get her epinephrine pens filled yet but will plan to do so.  See above for other details.    Etiology remains unclear. No new medications. Denies any supplements or over-the-counter medications other than occasionally some acetaminophen.  She did take 1 dose of Excedrin the day prior to admission at onset of worsening symptoms but had not taken that prior. Reports symptoms have been worse at night. She switch to Free and clear 1 week ago from [employer] pods. Uses [employer] fabric softener sheets. 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 Uses lots of products but nothing new. She was prescribed topical diclofenac about 4 months ago but reports she never took it. Denies any NSAID use.  Pharmacist did an extensive literature review and did not find any of her medications related to angioedema.  Discharge Medications with Med changes:     Discharge Medication List as of 11/21/2021 11:10 AM      START taking these medications    Details   famotidine (PEPCID) 10 MG tablet Take 1 tablet (10 mg) by mouth 2 times daily, No Print Out      LORazepam (ATIVAN) 0.5 MG tablet Take 1 tablet (0.5 mg) by mouth every 4 hours as needed for anxiety, Disp-4 tablet, R-0, Local Print         CONTINUE these medications which have CHANGED    Details   diphenhydrAMINE (BENADRYL) 25 MG capsule Take 2 capsules (50 mg) by mouth every 4 hours as needed for itching or allergies, No Print Out      EPINEPHrine (ANY BX GENERIC EQUIV) 0.3 MG/0.3ML injection 2-pack Inject 0.3 mLs (0.3 mg) into the muscle daily as needed for anaphylaxis, Disp-2 each, R-1, Local Print         CONTINUE these medications which have NOT CHANGED    Details   busPIRone (BUSPAR) 10 MG tablet Take 20 mg by mouth daily, Historical      desvenlafaxine (PRISTIQ) 100 MG 24 hr  tablet Take 100 mg by mouth daily, Historical      levonorgestrel (MIRENA) 20 MCG/24HR IUD 1 each by Intrauterine route continuousHistorical         STOP taking these medications       predniSONE (DELTASONE) 20 MG tablet Comments:   Reason for Stopping:                     Rationale for medication changes:      Intensivist did not feel patient would benefit from additional steroids.  Up-to-date felt there was no randomized trials that showed benefit for angioedema thus her prior prednisone was stopped    She had been on famotidine at discharge from the Urgency Room and will continue that with as needed diphenhydramine    Get epinephrine pens failed        Consults     VASCULAR ACCESS ADULT IP CONSULT  SOCIAL WORK IP CONSULT  INTENSIVIST IP CONSULT  PHARMACY IP CONSULT  SOCIAL WORK IP CONSULT    Immunizations given this encounter     Most Recent Immunizations   Administered Date(s) Administered     COVID-19,PF,Moderna 06/15/2021     HepB, Unspecified 05/13/1998     Influenza Vaccine IM > 6 months Valent IIV4 (Alfuria,Fluzone) 11/21/2021     MMR 02/28/2002     Meningococcal (Menactra ) 08/19/2005     Td,adult,historic,unspecified 05/29/2001     Tdap (Adacel,Boostrix) 07/11/2013           Anticoagulation Information      NA    SIGNIFICANT IMAGING FINDINGS     Results for orders placed or performed during the hospital encounter of 11/20/21   XR Chest Port 1 View    Impression    IMPRESSION: Right PICC with tip at the cavoatrial junction.       SIGNIFICANT LABORATORY FINDINGS     Most Recent 3 CBC's:Recent Labs   Lab Test 11/21/21  0028 11/20/21  1312   WBC 15.7* 17.8*   HGB 11.7 12.9   MCV 90 88    350     Most Recent 3 BMP's:Recent Labs   Lab Test 11/21/21  0400 11/21/21  0028 11/21/21  0001 11/20/21  1505   NA  --  139  --  141   POTASSIUM  --  4.4  --  3.3*   CHLORIDE  --  109*  --  113*   CO2  --  20*  --  18*   BUN  --  11  --  11   CR  --  0.96  --  0.90   ANIONGAP  --  10  --  10   MAVERICK  --  9.1  --  9.5    * 258* 242* 148*     Most Recent 2 LFT's:No lab results found.      Discharge Orders        Reason for your hospital stay    Patient admitted with 2-week history of recurrent urticaria and 1 day history of recurrent angioedema with lip and tongue swelling.  Now symptoms have resolved.     Follow-up and recommended labs and tests     Follow up with primary care provider, Provider Not In System, within 7 days to evaluate medication change and for hospital follow- up.  No follow up labs or test are needed.  Review hospital stay and lab test ordered to evaluate for allergies/angioedema.  Needs to get set up to see allergist in the near future.     Activity    Your activity upon discharge: activity as tolerated     Diet    Follow this diet upon discharge: Regular       Examination   Physical Exam      Wt Readings from Last 1 Encounters:   11/20/21 85.2 kg (187 lb 12.8 oz)       General Appearance: Alert and appears in no acute distress except perhaps mildly anxious  Respiratory: Lungs are clear throughout  Cardiovascular: Regular rate and rhythm without murmur and rate of 64  GI: Soft, nontender  Skin: No rashes or erythema  Other: HEENT showed resolution of tongue and lip swelling noted at admission      Please see EMR for more detailed significant labs, imaging, consultant notes etc.    I, Bill Zuñiga MD, personally saw the patient today and spent less than or equal to 30 minutes discharging this patient.    Bill Zuñiga MD  [facility] [place]    CC:System, Provider Not In

## 2021-11-22 NOTE — PROGRESS NOTES
Clinic Care Coordination Contact  [facility]/Voicemail       Clinical Data: Care Coordinator Outreach  Reason for referral: TCM outreach  Outreach attempted x 1.  Left message on patient's voicemail with call back information and requested return call.  Plan:  Care Coordinator will try to reach patient again in 1-2 business days.       Madhavi Yun  Community Health Worker  [facility] [place]  Ph: [phone]

## 2021-11-23 LAB
IGE SERPL-ACNC: 20 KU/L (ref 0–114)
TRYPTASE SERPL-MCNC: 3.3 UG/L

## 2021-11-23 NOTE — PROGRESS NOTES
Clinic Care Coordination Contact  [facility]/Voicemail       Clinical Data: Care Coordinator Outreach  Reason for referral: TCM outreach  Outreach attempted x 2.  Left message on patient's voicemail with call back information and requested return call.  Plan:  Care Coordinator will do no further outreaches at this time.     Madhavi Yun  Community Health Worker  [facility] [place]  Ph: [phone]

## 2021-11-24 LAB
ATRIAL RATE - MUSE: 101 BPM
C1INH SERPL-MCNC: 42 MG/DL
DIASTOLIC BLOOD PRESSURE - MUSE: 57 MMHG
INTERPRETATION ECG - MUSE: NORMAL
P AXIS - MUSE: 67 DEGREES
PR INTERVAL - MUSE: 176 MS
QRS DURATION - MUSE: 102 MS
QT - MUSE: 358 MS
QTC - MUSE: 464 MS
R AXIS - MUSE: 54 DEGREES
SYSTOLIC BLOOD PRESSURE - MUSE: 117 MMHG
T AXIS - MUSE: 57 DEGREES
VENTRICULAR RATE- MUSE: 101 BPM

## 2021-11-25 LAB — C1INH ACT/NOR SERPL: 104 %
